# Patient Record
Sex: MALE | Race: BLACK OR AFRICAN AMERICAN
[De-identification: names, ages, dates, MRNs, and addresses within clinical notes are randomized per-mention and may not be internally consistent; named-entity substitution may affect disease eponyms.]

---

## 2021-12-11 ENCOUNTER — HOSPITAL ENCOUNTER (EMERGENCY)
Dept: HOSPITAL 61 - ER | Age: 76
Discharge: HOME | End: 2021-12-11
Payer: MEDICARE

## 2021-12-11 VITALS — SYSTOLIC BLOOD PRESSURE: 174 MMHG | DIASTOLIC BLOOD PRESSURE: 70 MMHG

## 2021-12-11 VITALS — HEIGHT: 67 IN | WEIGHT: 190.48 LBS | BODY MASS INDEX: 29.9 KG/M2

## 2021-12-11 DIAGNOSIS — F17.200: ICD-10-CM

## 2021-12-11 DIAGNOSIS — K59.00: Primary | ICD-10-CM

## 2021-12-11 PROCEDURE — 99283 EMERGENCY DEPT VISIT LOW MDM: CPT

## 2021-12-11 NOTE — PHYS DOC
Past Medical History


Past Medical History:  No Pertinent History


Past Surgical History:  No Surgical History


Smoking Status:  Current Every Day Smoker


Alcohol Use:  None


Drug Use:  None





General Adult


EDM:


Chief Complaint:  CONSTIPATION





HPI:


HPI:





Patient is a 76-year-old male who presents to the emergency department for 

constipation problems that this past Tuesday.  Patient reports he has off-and-on

problems with constipation but has never gone 5 days without having a bowel 

movement.  Patient reports he had abdominal discomfort, with straining at home 

and could not get any bowel movement results.  Patient reports when he came to 

the emergency department he had a large bowel movement during his triage process

with the ED nurse, patient reports he feels much better now and has no abdominal

pains or discomfort, did not see blood in his stool, denies nausea or vomiting. 

Denies chest pains or recent fever or chills.  Patient reports he takes over-

the-counter allergy medications and nasal spray for seasonal allergies otherwise

is not prescribed any other medications by his primary care physician Dr. Wren.  

Patient denies other physical complaints or physical concerns.





Review of Systems:


Review of Systems:


14 body systems of review of systems have been reviewed.  See HPI for pertinent 

positives and negative responses, otherwise all other systems are negative, 

nonpertinent or noncontributory.


Constitutional: Negative except as outlined in HPI above.


Skin: Negative except as outlined in HPI above.


Eyes:   Negative except as outlined in HPI above.


HENT: Negative except as outlined in HPI above.


Respiratory:   Negative except as outlined in HPI above.


Cardiovascular:   Negative except as outlined in HPI above.


GI:   Negative except as outlined in HPI above.


:  Negative except as outlined in HPI above.


Musculoskeletal:   Negative except as outlined in HPI above.


Integument:   Negative except as outlined in HPI above.


Neurologic:   Negative except as outlined in HPI above.


Endocrine:   Negative except as outlined in HPI above.


Lymphatic:  Negative except as outlined in HPI above.


Psychiatric:  Negative except as outlined in HPI above.





Heart Score:


C/O Chest Pain:  No


Risk Factors:


Risk Factors:  DM, Current or recent (<one month) smoker, HTN, HLP, family 

history of CAD, obesity.


Risk Scores:


Score 0 - 3:  2.5% MACE over next 6 weeks - Discharge Home


Score 4 - 6:  20.3% MACE over next 6 weeks - Admit for Clinical Observation


Score 7 - 10:  72.7% MACE over next 6 weeks - Early Invasive Strategies





Allergies:


Allergies:





Allergies








Coded Allergies Type Severity Reaction Last Updated Verified


 


  No Known Drug Allergies    11/12/16 No











Physical Exam:


PE:





Constitutional: Well developed, well nourished, no acute distress, non-toxic 

appearance.  76-year-old male in no apparent distress.


HENT: Normocephalic, atraumatic.


Eyes: Conjunctiva normal, no discharge.


Neck: Normal range of motion, no stridor.


Cardiovascular: No cyanosis appreciated, distal cap refill less than 2 seconds.


Lungs & Thorax: Patient is in no respiratory distress, no audible adventitious 

lung sounds appreciated.


Abdomen: Nontender, no abnormalities noted.  Normal bowel sounds all 4 

quadrants, no skin discoloration of the abdomen appreciated.


Skin: Warm, dry, no erythema, no rash.  


Back: No tenderness, no deformities.


Extremities: No tenderness, no cyanosis, no clubbing, ROM intact, no edema.  


Neurologic: Alert and oriented X 3, normal motor function, normal sensory 

function, no focal deficits noted. 


Psychologic: Affect normal, judgement normal, mood normal.





Current Patient Data:


Vital Signs:





                                   Vital Signs








  Date Time  Temp Pulse Resp B/P (MAP) Pulse Ox O2 Delivery O2 Flow Rate FiO2


 


12/11/21 11:48 97.3 61 18 197/80 (119) 99 Room Air  





 97.3       











EKG:


EKG:


[]





Radiology/Procedures:


Radiology/Procedures:


[]





Course & Med Decision Making:


Course & Med Decision Making


Pertinent Labs and Imaging studies reviewed. (See chart for details)





76-year-old male, vital signs reviewed, presents to the emergency department 

concerning constipation at home.  Patient's physical examination unremarkable, 

during nursing triage process, patient used bathroom and reported a large bowel 

movement, reports all his symptoms were resolved.  It was noted patient had 

blood pressure of 197/80, patient reports he has a history of hypertensive 

episodes when at physician's office however he does not have hypertension and is

 not treated.  A bedside noninvasive blood pressure performed by myself was 

reduced to 179/79.  Discussed with patient to let Dr. Wren know of his hy

pertension at the emergency department, however we discussed blood pressure may 

have been elevated because of recent abdominal discomfort or there may be a 

white coat syndrome component.  Discussed with patient using over-the-counter 

MiraLAX to keep stools soft, patient reports he has used this in the past and 

has some at home.  Discussed return ER precautions or concerns.  Patient has 

asked for a prescription of ibuprofen for any returning discomfort.  Patient is 

currently in no apparent distress, nontoxic in appearance, will discharge to 

home with diagnosis of constipation.  Patient gave verbal understanding of and 

is amenable to ED discharge planning.





Discussed with the patient all findings and diagnostic testing as well as the 

need to follow-up with their primary care provider for further evaluation and 

treatment or return to the ED if any new or worsening symptoms.  Strict return 

precautions were also discussed at length, the patient voiced understanding and 

agreement with the discharge planning.  The patient was nontoxic in appearance, 

in no apparent distress, and hemodynamically stable at the time of disposition.





Dragon Disclaimer:


Dragon Disclaimer:


This electronic medical record was generated, in whole or in part, using a voice

 recognition dictation system.





Departure


Departure


Impression:  


   Primary Impression:  


   Constipation


   Qualified Codes:  K59.00 - Constipation, unspecified


Disposition:  01 HOME / SELF CARE / HOMELESS


Condition:  GOOD


Referrals:  


YONG WRNE MD (PCP)


Patient Instructions:  Constipation, Adult





Additional Instructions:  


You were seen today in the emergency department for constipation problems.  You 

had a large bowel movement while in the ED today which resolved your symptoms.  

As we discussed please try taking over-the-counter MiraLAX as directed to help 

keep stools soft, as we discussed your blood pressure was elevated today, it was

 179/79, you had reported hypotensive episodes in the past however have not been

 started on any blood pressure medications, please follow-up with Dr. Wren this 

week to discuss constipation problems and elevated blood pressure episodes.  

Please return to the emergency department for any returning severe abdominal 

pains, syncopal episodes, chest pains, or other concerns.  You could ask for a 

prescription of ibuprofen for any returning discomfort, I am prescribing you a 

short regimen of 600 mg ibuprofen to take as needed for discomfort.  Thank you 

for visiting our Emergency Department.  It was a pleasure taking care of you 

today in the emergency department and we appreciate you trusting us with your 

care. If any additional problems come up don't hesitate to return to visit us. 

Please follow up with your primary care provider so they can plan additional 

care if needed and know about the problem that you had. If symptoms worsen come 

back to the Emergency Department. Any concerning symptoms that start such as 

chest pain, shortness of air, weakness or numbness on one side of the body, 

running high fevers or any other concerning symptoms return to the ER.


Scripts


Ibuprofen (IBUPROFEN) 600 Mg Tablet


600 MG PO PRN Q6HRS PRN for INFLAMMATION, #30 TAB 0 Refills


   Prov: JERMAN POTTS         12/11/21











JERMAN POTTS       Dec 11, 2021 12:19